# Patient Record
Sex: MALE | Race: WHITE | NOT HISPANIC OR LATINO | Employment: FULL TIME | URBAN - METROPOLITAN AREA
[De-identification: names, ages, dates, MRNs, and addresses within clinical notes are randomized per-mention and may not be internally consistent; named-entity substitution may affect disease eponyms.]

---

## 2024-08-16 ENCOUNTER — OFFICE VISIT (OUTPATIENT)
Dept: URGENT CARE | Facility: CLINIC | Age: 34
End: 2024-08-16

## 2024-08-16 VITALS
HEART RATE: 75 BPM | DIASTOLIC BLOOD PRESSURE: 72 MMHG | BODY MASS INDEX: 20.76 KG/M2 | OXYGEN SATURATION: 97 % | HEIGHT: 68 IN | WEIGHT: 137 LBS | SYSTOLIC BLOOD PRESSURE: 120 MMHG | TEMPERATURE: 98 F | RESPIRATION RATE: 20 BRPM

## 2024-08-16 DIAGNOSIS — Z91.89 HISTORY OF FOOD POISONING: ICD-10-CM

## 2024-08-16 DIAGNOSIS — J02.9 PHARYNGITIS, UNSPECIFIED ETIOLOGY: ICD-10-CM

## 2024-08-16 PROCEDURE — 3078F DIAST BP <80 MM HG: CPT

## 2024-08-16 PROCEDURE — 3074F SYST BP LT 130 MM HG: CPT

## 2024-08-16 PROCEDURE — 99212 OFFICE O/P EST SF 10 MIN: CPT

## 2024-08-16 ASSESSMENT — ENCOUNTER SYMPTOMS: SORE THROAT: 1

## 2024-08-16 NOTE — LETTER
August 16, 2024         Patient: Nemesio Milligan   YOB: 1990   Date of Visit: 8/16/2024           To Whom it May Concern:    Nemesio Milligan was seen in my clinic on 8/16/2024. He may return to work on 8/16/24.    If you have any questions or concerns, please don't hesitate to call.        Sincerely,           ERASTO Ruiz.  Electronically Signed      27-Dec-2023 22:11

## 2024-08-16 NOTE — PROGRESS NOTES
"Subjective:   Nemesio Milligan is a 34 y.o. male who presents for Emesis (Sx started 4 days ago, was throwing up a lot, Now has sore throat, wants to get checked up to make sure he's okay for work. Needs Dr. Villeda. HAS NO symptoms currently. )      HPI  Patient presents with a history of vomiting and sore throat that occurred 8/12/24 for 12 hours after eating out. Patient states last time he vomited was 8/13/24.  Patient states he is symptom-free today, outside of a sore throat from vomiting earlier in the week, but would like a work note to return to work for today.    Review of Systems   HENT:  Positive for sore throat.    All other systems reviewed and are negative.      Medical History:  History reviewed. No pertinent past medical history.    Allergies:  Allergies   Allergen Reactions    Penicillins Rash     Break out in Hives       Social history, surgical history, medications, and current problem list reviewed today in Epic.       Objective:     /72 (BP Location: Left arm, Patient Position: Sitting, BP Cuff Size: Small adult)   Pulse 75   Temp 36.7 °C (98 °F) (Temporal)   Resp 20   Ht 1.727 m (5' 8\")   Wt 62.1 kg (137 lb)   SpO2 97%     Physical Exam  Vitals reviewed.   Constitutional:       General: He is not in acute distress.     Appearance: Normal appearance. He is not ill-appearing, toxic-appearing or diaphoretic.   HENT:      Head: Normocephalic and atraumatic.      Right Ear: External ear normal.      Left Ear: External ear normal.      Nose: Nose normal.      Mouth/Throat:      Mouth: Mucous membranes are moist.      Pharynx: Posterior oropharyngeal erythema present.   Eyes:      Pupils: Pupils are equal, round, and reactive to light.   Cardiovascular:      Rate and Rhythm: Normal rate.      Pulses: Normal pulses.   Pulmonary:      Effort: Pulmonary effort is normal.   Musculoskeletal:         General: Normal range of motion.      Cervical back: Normal range of motion.   Skin:     " General: Skin is warm.      Capillary Refill: Capillary refill takes less than 2 seconds.   Neurological:      General: No focal deficit present.      Mental Status: He is alert and oriented to person, place, and time.   Psychiatric:         Mood and Affect: Mood normal.         Behavior: Behavior normal.         Assessment/Plan:       Diagnosis and associated orders:     1. Pharyngitis, unspecified etiology    2. History of food poisoning     Comments/MDM:       34-year-old afebrile, generally well-appearing, hemodynamically stable male presenting for a work note to return to work.  Patient experienced an episode of food poisoning on Monday, his employer told him to take a few days to rest but that he would need to follow-up with a provider to ensure that he is safe to return to work.  Work note was provided.  No concerning physical exam features noted.      Patient is clinically stable at today's acute urgent care visit. Vital signs are normal and reassuring.  No acute distress noted. Appropriate for outpatient management at this time. No red flag warnings noted.  Patient given strict instructions to follow up with emergency room if they develop any red flag warnings which were discussed in depth.  They verbalized understanding.      Differential diagnosis, natural history, supportive care, and indications for immediate follow-up discussed. All questions answered. Patient agrees with the plan of care. Advised the patient to follow-up with the primary care physician for recheck, reevaluation, and consideration of further management or the emergency room for worsening symptoms.      Please note that this dictation was created using voice recognition software. I have made every reasonable attempt to correct obvious errors, but I expect that there are errors of grammar and possibly content that I did not discover before finalizing the note.